# Patient Record
Sex: MALE | Race: OTHER | HISPANIC OR LATINO | Employment: OTHER | URBAN - METROPOLITAN AREA
[De-identification: names, ages, dates, MRNs, and addresses within clinical notes are randomized per-mention and may not be internally consistent; named-entity substitution may affect disease eponyms.]

---

## 2018-06-04 LAB
ABSOL LYMPHOCYTES (HISTORICAL): 1.9 K/UL (ref 0.5–4)
ALBUMIN SERPL BCP-MCNC: 4 G/DL (ref 3–5.2)
ALP SERPL-CCNC: 87 U/L (ref 43–122)
ALT SERPL W P-5'-P-CCNC: 34 U/L (ref 9–52)
ANION GAP SERPL CALCULATED.3IONS-SCNC: 7 MMOL/L (ref 5–14)
AST SERPL W P-5'-P-CCNC: 26 U/L (ref 17–59)
BASOPHILS # BLD AUTO: 0 % (ref 0–1)
BASOPHILS # BLD AUTO: 0 K/UL (ref 0–0.1)
BILIRUB SERPL-MCNC: 0.6 MG/DL
BUN SERPL-MCNC: 17 MG/DL (ref 5–25)
CALCIUM SERPL-MCNC: 9.2 MG/DL (ref 8.4–10.2)
CHLORIDE SERPL-SCNC: 104 MEQ/L (ref 97–108)
CK SERPL-CCNC: 107 U/L (ref 55–170)
CO2 SERPL-SCNC: 29 MMOL/L (ref 22–30)
COMMENT (HISTORICAL): ABNORMAL
CREATINE, SERUM (HISTORICAL): 0.98 MG/DL (ref 0.7–1.5)
DEPRECATED RDW RBC AUTO: 14.6 %
EGFR (HISTORICAL): >60 ML/MIN/1.73 M2
EOSINOPHIL # BLD AUTO: 0.4 K/UL (ref 0–0.4)
EOSINOPHIL NFR BLD AUTO: 5 % (ref 0–6)
GLUCOSE SERPL-MCNC: 72 MG/DL (ref 70–99)
HCT VFR BLD AUTO: 44.5 % (ref 41–53)
HGB BLD-MCNC: 14.7 G/DL (ref 13.5–17.5)
LIPASE SERPL-CCNC: 106 U/L (ref 23–300)
LYMPHOCYTES NFR BLD AUTO: 23 % (ref 25–45)
MCH RBC QN AUTO: 29 PG (ref 26–34)
MCHC RBC AUTO-ENTMCNC: 33 % (ref 31–36)
MCV RBC AUTO: 88 FL (ref 80–100)
METHYL ALCOHOL (HISTORICAL): NEGATIVE MG/DL
MONOCYTES # BLD AUTO: 1.3 K/UL (ref 0.2–0.9)
MONOCYTES NFR BLD AUTO: 16 % (ref 1–10)
NEUTROPHILS ABS COUNT (HISTORICAL): 4.7 K/UL (ref 1.8–7.8)
NEUTS SEG NFR BLD AUTO: 56 % (ref 45–65)
PLATELET # BLD AUTO: 369 K/MCL (ref 150–450)
POTASSIUM SERPL-SCNC: 4.3 MEQ/L (ref 3.6–5)
RBC # BLD AUTO: 5.06 M/MCL (ref 4.5–5.9)
RBC MORPHOLOGY (HISTORICAL): ABNORMAL
SODIUM SERPL-SCNC: 140 MEQ/L (ref 137–147)
TOTAL PROTEIN (HISTORICAL): 7.4 G/DL (ref 5.9–8.4)
TROPONIN I SERPL-MCNC: <0.01 NG/ML (ref 0–0.03)
WBC # BLD AUTO: 8.3 K/MCL (ref 4.5–11)

## 2018-10-28 ENCOUNTER — HOSPITAL ENCOUNTER (EMERGENCY)
Facility: HOSPITAL | Age: 43
Discharge: HOME | End: 2018-10-28
Attending: EMERGENCY MEDICINE
Payer: COMMERCIAL

## 2018-10-28 VITALS
TEMPERATURE: 98.1 F | WEIGHT: 117.28 LBS | RESPIRATION RATE: 16 BRPM | SYSTOLIC BLOOD PRESSURE: 122 MMHG | HEART RATE: 93 BPM | DIASTOLIC BLOOD PRESSURE: 67 MMHG | OXYGEN SATURATION: 100 %

## 2018-10-28 DIAGNOSIS — Z76.0 MEDICATION REFILL: Primary | ICD-10-CM

## 2018-10-28 PROCEDURE — 99281 EMR DPT VST MAYX REQ PHY/QHP: CPT

## 2018-10-28 RX ORDER — MIRTAZAPINE 15 MG/1
15 TABLET, FILM COATED ORAL
Qty: 5 TABLET | Refills: 0 | Status: SHIPPED | OUTPATIENT
Start: 2018-10-28

## 2018-10-28 RX ORDER — ARIPIPRAZOLE 15 MG/1
15 TABLET ORAL DAILY
Qty: 5 TABLET | Refills: 0 | Status: SHIPPED | OUTPATIENT
Start: 2018-10-28

## 2018-10-28 RX ORDER — ALPRAZOLAM 0.25 MG/1
0.25 TABLET ORAL 2 TIMES DAILY
Qty: 10 TABLET | Refills: 0 | Status: SHIPPED | OUTPATIENT
Start: 2018-10-28 | End: 2018-11-02

## 2018-10-28 NOTE — DISCHARGE INSTRUCTIONS
Medicine Refill   WHAT YOU NEED TO KNOW:   You may have been given a prescription in the emergency department for a few days of your medicine  It is important to refill your medicine before you completely run out  You need to follow up with your healthcare provider for a full prescription within the next few days  You will not be given additional refills in the emergency department  DISCHARGE INSTRUCTIONS:   Follow up with your healthcare provider:  Contact your healthcare provider before  you are completely out of medicine  Write down your questions so you remember to ask them during your visits  Refill tips:  Your medicine will treat your condition if you take the medicine regularly  Prevent missed doses by doing the following:  · Keep a chart of your medicine  Include all of your current medicines  Write down the name and strength of each medicine, the prescription number, and the number of refills  Also write down the dates of your refills  Ask your pharmacy or insurance provider for other ways to help you keep track of your medicines  · Refill medicines a few days before you run out  This will decrease any problems that will prevent you from getting your medicines on time  Problems include a closed pharmacy, or the pharmacy may have to contact your healthcare provider  · If you know you are going to be traveling, refill your medicines before you leave  You may not be able to get refills if you do not use your local pharmacy  You may need to call your insurance provider to make them aware of your travels  © 2017 [employer] Information is for End User's use only and may not be sold, redistributed or otherwise used for commercial purposes  All illustrations and images included in CareNotes® are the copyrighted property of [employer]  or Tomasz Rojas  The above information is an  only   It is not intended as medical advice for individual conditions or treatments  Talk to your doctor, nurse or pharmacist before following any medical regimen to see if it is safe and effective for you
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
## 2020-01-30 ENCOUNTER — HOSPITAL ENCOUNTER (EMERGENCY)
Facility: HOSPITAL | Age: 45
Discharge: LEFT BEFORE DISCHARGE | End: 2020-01-30
Attending: EMERGENCY MEDICINE | Admitting: EMERGENCY MEDICINE
Payer: COMMERCIAL

## 2020-01-30 ENCOUNTER — APPOINTMENT (EMERGENCY)
Dept: CT IMAGING | Facility: HOSPITAL | Age: 45
End: 2020-01-30
Payer: COMMERCIAL

## 2020-01-30 VITALS
RESPIRATION RATE: 16 BRPM | OXYGEN SATURATION: 100 % | HEART RATE: 118 BPM | WEIGHT: 119 LBS | TEMPERATURE: 98.5 F | DIASTOLIC BLOOD PRESSURE: 98 MMHG | SYSTOLIC BLOOD PRESSURE: 169 MMHG

## 2020-01-30 DIAGNOSIS — S09.90XA INJURY OF HEAD, INITIAL ENCOUNTER: ICD-10-CM
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
## 2020-01-30 PROCEDURE — 70450 CT HEAD/BRAIN W/O DYE: CPT

## 2020-01-30 PROCEDURE — 99284 EMERGENCY DEPT VISIT MOD MDM: CPT

## 2020-01-30 PROCEDURE — 99282 EMERGENCY DEPT VISIT SF MDM: CPT | Performed by: EMERGENCY MEDICINE

## 2020-01-30 PROCEDURE — 72125 CT NECK SPINE W/O DYE: CPT
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
## 2020-01-31 NOTE — ED NOTES
[employer] desk contacted per pt request - dispatcher states an [employer] officer will report to ER to speak with pt     Chrys Fleischer, RN  01/30/20 9658

## 2020-03-03 ENCOUNTER — HOSPITAL ENCOUNTER (INPATIENT)
Facility: HOSPITAL | Age: 45
LOS: 1 days | Discharge: LEFT BEFORE DISCHARGE | DRG: 812 | End: 2020-03-03
Attending: EMERGENCY MEDICINE | Admitting: FAMILY MEDICINE
Payer: COMMERCIAL

## 2020-03-03 ENCOUNTER — APPOINTMENT (EMERGENCY)
Dept: RADIOLOGY | Facility: HOSPITAL | Age: 45
DRG: 812 | End: 2020-03-03
Payer: COMMERCIAL

## 2020-03-03 VITALS
RESPIRATION RATE: 9 BRPM | TEMPERATURE: 98.4 F | DIASTOLIC BLOOD PRESSURE: 72 MMHG | SYSTOLIC BLOOD PRESSURE: 122 MMHG | OXYGEN SATURATION: 99 % | HEART RATE: 74 BPM | WEIGHT: 118.61 LBS

## 2020-03-03 DIAGNOSIS — T50.901A OVERDOSE: Primary | ICD-10-CM

## 2020-03-03 PROBLEM — I46.9 CARDIAC ARREST (HCC): Status: ACTIVE | Noted: 2020-03-03

## 2020-03-03 PROBLEM — F32.A DEPRESSION: Status: ACTIVE | Noted: 2020-03-03

## 2020-03-03 PROBLEM — Z72.89 ALCOHOL USE: Status: ACTIVE | Noted: 2020-03-03

## 2020-03-03 PROBLEM — R77.8 ELEVATED TROPONIN: Status: ACTIVE | Noted: 2020-03-03

## 2020-03-03 PROBLEM — F41.9 ANXIETY: Status: ACTIVE | Noted: 2020-03-03

## 2020-03-03 PROBLEM — G93.40 ACUTE ENCEPHALOPATHY: Status: ACTIVE | Noted: 2020-03-03

## 2020-03-03 PROBLEM — E87.29 HIGH ANION GAP METABOLIC ACIDOSIS: Status: ACTIVE | Noted: 2020-03-03

## 2020-03-03 PROBLEM — Z78.9 ALCOHOL USE: Status: ACTIVE | Noted: 2020-03-03

## 2020-03-03 PROBLEM — E87.2 HIGH ANION GAP METABOLIC ACIDOSIS: Status: ACTIVE | Noted: 2020-03-03

## 2020-03-03 PROBLEM — R79.89 ELEVATED TROPONIN: Status: ACTIVE | Noted: 2020-03-03

## 2020-03-03 LAB
ALBUMIN SERPL BCP-MCNC: 4.4 G/DL (ref 3–5.2)
ALP SERPL-CCNC: 83 U/L (ref 43–122)
ALT SERPL W P-5'-P-CCNC: 22 U/L (ref 9–52)
ANION GAP SERPL CALCULATED.3IONS-SCNC: 19 MMOL/L (ref 5–14)
AST SERPL W P-5'-P-CCNC: 30 U/L (ref 17–59)
ATRIAL RATE: 95 BPM
BILIRUB SERPL-MCNC: 0.4 MG/DL
BUN SERPL-MCNC: 11 MG/DL (ref 5–25)
CALCIUM SERPL-MCNC: 8.9 MG/DL (ref 8.4–10.2)
CHLORIDE SERPL-SCNC: 102 MMOL/L (ref 97–108)
CO2 SERPL-SCNC: 15 MMOL/L (ref 22–30)
CREAT SERPL-MCNC: 1.13 MG/DL (ref 0.7–1.5)
EOSINOPHIL # BLD AUTO: 0.26 THOUSAND/UL (ref 0–0.4)
EOSINOPHIL NFR BLD MANUAL: 3 % (ref 0–6)
ERYTHROCYTE [DISTWIDTH] IN BLOOD BY AUTOMATED COUNT: 15.6 %
ETHANOL SERPL-MCNC: 36 MG/DL (ref 0–10)
GFR SERPL CREATININE-BSD FRML MDRD: 78 ML/MIN/1.73SQ M
GLUCOSE SERPL-MCNC: 185 MG/DL (ref 70–99)
HCT VFR BLD AUTO: 46.4 % (ref 41–53)
HGB BLD-MCNC: 15.1 G/DL (ref 13.5–17.5)
LYMPHOCYTES # BLD AUTO: 2.55 THOUSAND/UL (ref 0.5–4)
LYMPHOCYTES # BLD AUTO: 30 % (ref 25–45)
MAGNESIUM SERPL-MCNC: 2.3 MG/DL (ref 1.6–2.3)
MCH RBC QN AUTO: 28.5 PG (ref 26–34)
MCHC RBC AUTO-ENTMCNC: 32.5 G/DL (ref 31–36)
MCV RBC AUTO: 88 FL (ref 80–100)
MONOCYTES # BLD AUTO: 1.62 THOUSAND/UL (ref 0.2–0.9)
MONOCYTES NFR BLD AUTO: 19 % (ref 1–10)
NEUTS BAND NFR BLD MANUAL: 1 % (ref 0–8)
NEUTS SEG # BLD: 3.83 THOUSAND/UL (ref 1.8–7.8)
NEUTS SEG NFR BLD AUTO: 44 %
P AXIS: 75 DEGREES
PHOSPHATE SERPL-MCNC: 5.4 MG/DL (ref 2.5–4.8)
PLATELET # BLD AUTO: 412 THOUSANDS/UL (ref 150–450)
PLATELET BLD QL SMEAR: ABNORMAL
PMV BLD AUTO: 8.8 FL (ref 8.9–12.7)
POTASSIUM SERPL-SCNC: 3.7 MMOL/L (ref 3.6–5)
PR INTERVAL: 120 MS
PROT SERPL-MCNC: 8.3 G/DL (ref 5.9–8.4)
QRS AXIS: 66 DEGREES
QRSD INTERVAL: 102 MS
QT INTERVAL: 358 MS
QTC INTERVAL: 449 MS
RBC # BLD AUTO: 5.29 MILLION/UL (ref 4.5–5.9)
RBC MORPH BLD: NORMAL
SODIUM SERPL-SCNC: 136 MMOL/L (ref 137–147)
T WAVE AXIS: 66 DEGREES
TOTAL CELLS COUNTED SPEC: 100
TROPONIN I SERPL-MCNC: 0.11 NG/ML (ref 0–0.03)
TROPONIN I SERPL-MCNC: <0.01 NG/ML (ref 0–0.03)
VARIANT LYMPHS # BLD AUTO: 3 % (ref 0–0)
VENTRICULAR RATE: 95 BPM
WBC # BLD AUTO: 8.5 THOUSAND/UL (ref 4.5–11)

## 2020-03-03 PROCEDURE — 36415 COLL VENOUS BLD VENIPUNCTURE: CPT | Performed by: EMERGENCY MEDICINE

## 2020-03-03 PROCEDURE — 99285 EMERGENCY DEPT VISIT HI MDM: CPT

## 2020-03-03 PROCEDURE — 99285 EMERGENCY DEPT VISIT HI MDM: CPT | Performed by: EMERGENCY MEDICINE

## 2020-03-03 PROCEDURE — 85027 COMPLETE CBC AUTOMATED: CPT | Performed by: EMERGENCY MEDICINE

## 2020-03-03 PROCEDURE — 83735 ASSAY OF MAGNESIUM: CPT | Performed by: NURSE PRACTITIONER

## 2020-03-03 PROCEDURE — 96365 THER/PROPH/DIAG IV INF INIT: CPT

## 2020-03-03 PROCEDURE — 93010 ELECTROCARDIOGRAM REPORT: CPT | Performed by: INTERNAL MEDICINE

## 2020-03-03 PROCEDURE — 93005 ELECTROCARDIOGRAM TRACING: CPT

## 2020-03-03 PROCEDURE — 85007 BL SMEAR W/DIFF WBC COUNT: CPT | Performed by: EMERGENCY MEDICINE

## 2020-03-03 PROCEDURE — 80053 COMPREHEN METABOLIC PANEL: CPT | Performed by: EMERGENCY MEDICINE

## 2020-03-03 PROCEDURE — 71046 X-RAY EXAM CHEST 2 VIEWS: CPT

## 2020-03-03 PROCEDURE — 84100 ASSAY OF PHOSPHORUS: CPT | Performed by: NURSE PRACTITIONER

## 2020-03-03 PROCEDURE — 96361 HYDRATE IV INFUSION ADD-ON: CPT

## 2020-03-03 PROCEDURE — 96375 TX/PRO/DX INJ NEW DRUG ADDON: CPT

## 2020-03-03 PROCEDURE — 84484 ASSAY OF TROPONIN QUANT: CPT | Performed by: EMERGENCY MEDICINE

## 2020-03-03 PROCEDURE — 80320 DRUG SCREEN QUANTALCOHOLS: CPT | Performed by: EMERGENCY MEDICINE

## 2020-03-03 RX ORDER — NALOXONE HYDROCHLORIDE 1 MG/ML
2 INJECTION PARENTERAL ONCE
Status: COMPLETED | OUTPATIENT
Start: 2020-03-03 | End: 2020-03-03

## 2020-03-03 RX ORDER — HEPARIN SODIUM 5000 [USP'U]/ML
5000 INJECTION, SOLUTION INTRAVENOUS; SUBCUTANEOUS EVERY 8 HOURS SCHEDULED
Status: CANCELLED | OUTPATIENT
Start: 2020-03-03

## 2020-03-03 RX ORDER — NALOXONE HYDROCHLORIDE 0.4 MG/ML
INJECTION, SOLUTION INTRAMUSCULAR; INTRAVENOUS; SUBCUTANEOUS
Status: COMPLETED
Start: 2020-03-03 | End: 2020-03-03

## 2020-03-03 RX ORDER — ONDANSETRON 2 MG/ML
4 INJECTION INTRAMUSCULAR; INTRAVENOUS ONCE
Status: COMPLETED | OUTPATIENT
Start: 2020-03-03 | End: 2020-03-03

## 2020-03-03 RX ORDER — ASPIRIN 81 MG/1
324 TABLET, CHEWABLE ORAL ONCE
Status: COMPLETED | OUTPATIENT
Start: 2020-03-03 | End: 2020-03-03

## 2020-03-03 RX ADMIN — Medication 0.04 MG: at 18:04

## 2020-03-03 RX ADMIN — ASPIRIN 81 MG 324 MG: 81 TABLET ORAL at 19:39

## 2020-03-03 RX ADMIN — SODIUM CHLORIDE 1000 ML: 0.9 INJECTION, SOLUTION INTRAVENOUS at 14:58

## 2020-03-03 RX ADMIN — NALOXONE HYDROCHLORIDE 0.4 MG/HR: 1 INJECTION PARENTERAL at 18:47

## 2020-03-03 RX ADMIN — ONDANSETRON 4 MG: 2 INJECTION INTRAMUSCULAR; INTRAVENOUS at 15:21

## 2020-03-03 NOTE — ED NOTES
Security released envelope #[id number]  With belongings to family   Family signed for belongings       Jolanta Ibarra  03/03/20 6750
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
## 2020-03-03 NOTE — ED NOTES
Noted patient with snoring and sleeping, awaken easily but returning to sleep, noted some apnea; Dr Gloria Vargas aware   Oxygen applied at this time     Kia Webb, RN  03/03/20 9329
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
## 2020-03-03 NOTE — ED NOTES
EKG completed and handed to physician  PT placed on cardiac monitor       Mona Guevara  03/03/20 4200

## 2020-03-03 NOTE — ED PROVIDER NOTES
History  Chief Complaint   Patient presents with    Overdose - Accidental     Per EMS pt found unresponsive, CPR was initiated and narcan was administered  Pt alert and oriented at this time     39 yo M w/ PMH of depression, anxiety, schizophrenia, and a "heart disorder" presents to ED via EMS after an accidental OD from unknown substance  Pt admits to ETOH use since last night, and doesn't know what he snorted this afternoon, but denies regular drug use  Denies HI/SI  Denies pain, sob, just "doesn't feel well " had unknown white powder on self  Was unresponsive at home, had bystander cpr  Was agonal breathing when EMS arrived, got 2mg IN narcan with improvement, however then lost pulses for about a minute, got CPR by EMS for about a minute, improved with an additional 2 mg IV narcan  History provided by:  Patient and medical records   used: No    Overdose - Accidental   Ingested substance:  Illicit drugs and alcohol  Illicit drug type:  Unable to specify  Witnesses present: yes    Called poison control: no    Incident location:  Home  Context: intentional ingestion and substance found on patient's clothing    Associated symptoms: altered mental status, nausea, unresponsiveness and vomiting    Associated symptoms: no abdominal pain, no chest pain, no cough, no diaphoresis, no diarrhea, no headaches and no shortness of breath    Altered mental status:     Severity:  Severe    Onset quality:  Sudden      Prior to Admission Medications   Prescriptions Last Dose Informant Patient Reported? Taking?    ALPRAZolam (XANAX) 0 25 mg tablet   No No   Sig: Take 1 tablet (0 25 mg total) by mouth 2 (two) times a day for 10 doses   ARIPiprazole (ABILIFY) 15 mg tablet   No No   Sig: Take 1 tablet (15 mg total) by mouth daily   mirtazapine (REMERON) 15 mg tablet   No No   Sig: Take 1 tablet (15 mg total) by mouth daily at bedtime      Facility-Administered Medications: None       Past Medical History: Diagnosis Date    Anxiety     Depression     Schizophrenia [facility])        History reviewed  No pertinent surgical history  History reviewed  No pertinent family history  I have reviewed and agree with the history as documented  E-Cigarette/Vaping     E-Cigarette/Vaping Substances     Social History     Tobacco Use    Smoking status: Current Every Day Smoker     Types: Cigarettes    Smokeless tobacco: Never Used   Substance Use Topics    Alcohol use: Yes     Comment: socially    Drug use: Yes     Types: Cocaine       Review of Systems   Constitutional: Negative for chills, diaphoresis, fatigue, fever and unexpected weight change  HENT: Negative for congestion, ear pain, rhinorrhea, sore throat, trouble swallowing and voice change  Eyes: Negative for pain and visual disturbance  Respiratory: Negative for cough, chest tightness and shortness of breath  Cardiovascular: Negative for chest pain, palpitations and leg swelling  Gastrointestinal: Positive for nausea and vomiting  Negative for abdominal pain, blood in stool, constipation and diarrhea  Genitourinary: Negative for difficulty urinating and hematuria  Musculoskeletal: Negative for arthralgias, back pain and neck pain  Skin: Negative for rash  Neurological: Negative for dizziness, syncope, light-headedness and headaches  Psychiatric/Behavioral: Negative for confusion and suicidal ideas  The patient is not nervous/anxious  Physical Exam  Physical Exam   Constitutional: He is oriented to person, place, and time  He appears well-developed and well-nourished  No distress  HENT:   Head: Normocephalic and atraumatic  Right Ear: External ear normal    Left Ear: External ear normal    Nose: Nose normal    Mouth/Throat: Oropharynx is clear and moist    Pupils 2mm b/l, briskly responsive   Eyes: Pupils are equal, round, and reactive to light  Conjunctivae and EOM are normal  Right eye exhibits no discharge   Left eye exhibits no discharge  No scleral icterus  Neck: Normal range of motion  Neck supple  No JVD present  No tracheal deviation present  Cardiovascular: Regular rhythm, normal heart sounds and intact distal pulses  Tachycardia present  Exam reveals no gallop and no friction rub  No murmur heard  Pulmonary/Chest: Effort normal and breath sounds normal  No stridor  No respiratory distress  He has no wheezes  He has no rales  He exhibits no tenderness  Abdominal: Soft  Bowel sounds are normal  He exhibits no distension  There is no tenderness  There is no rebound and no guarding  Musculoskeletal: Normal range of motion  He exhibits no edema, tenderness or deformity  Lymphadenopathy:     He has no cervical adenopathy  Neurological: He is alert and oriented to person, place, and time  No cranial nerve deficit or sensory deficit  Coordination normal  GCS eye subscore is 4  GCS verbal subscore is 5  GCS motor subscore is 6  Skin: Skin is warm and dry  No rash noted  He is not diaphoretic  No track marks noted  Psychiatric: He has a normal mood and affect  His behavior is normal    Nursing note and vitals reviewed        Vital Signs  ED Triage Vitals   Temperature Pulse Respirations Blood Pressure SpO2   03/03/20 1440 03/03/20 1440 03/03/20 1440 03/03/20 1440 03/03/20 1440   98 4 °F (36 9 °C) (!) 109 16 116/79 100 %      Temp Source Heart Rate Source Patient Position - Orthostatic VS BP Location FiO2 (%)   03/03/20 1440 03/03/20 1440 03/03/20 1440 03/03/20 1440 --   Tympanic Monitor Lying Left arm       Pain Score       03/03/20 1532       No Pain           Vitals:    03/03/20 1815 03/03/20 1830 03/03/20 1845 03/03/20 1906   BP:    121/79   Pulse: 72 84 89 68   Patient Position - Orthostatic VS:    Lying         Visual Acuity      ED Medications  Medications   naloxone (NARCAN) 2 mg in sodium chloride 0 9 % 500 mL infusion (0 4 mg/hr Intravenous New Bag 3/3/20 3956)   sodium chloride 0 9 % bolus 1,000 mL (0 mL Intravenous Stopped 3/3/20 1643)   ondansetron (ZOFRAN) injection 4 mg (4 mg Intravenous Given 3/3/20 1521)   naloxone (FOR EMS ONLY) [facility]) 2 MG/2ML injection 4 mg (0 mg Does not apply Given to EMS 3/3/20 1450)   naloxone (NARCAN) 0 04 mg/mL syringe 0 04 mg (0 04 mg Intravenous Given 3/3/20 1804)   naloxone (NARCAN) 0 4 mg/mL injection **ADS Override Pull** (  Override Pull 3/3/20 9587)       Diagnostic Studies  Results Reviewed     Procedure Component Value Units Date/Time    Troponin I [[id number]]  (Abnormal) Collected:  03/03/20 1840    Lab Status:  Final result Specimen:  Blood from Arm, Right Updated:  03/03/20 1914     Troponin I 0 11 ng/mL     Troponin I [[id number]]  (Normal) Collected:  03/03/20 1458    Lab Status:  Final result Specimen:  Blood from Arm, Right Updated:  03/03/20 1535     Troponin I <0 01 ng/mL     CBC and differential [[id number]]  (Abnormal) Collected:  03/03/20 1458    Lab Status:  Final result Specimen:  Blood from Arm, Right Updated:  03/03/20 1528     WBC 8 50 Thousand/uL      RBC 5 29 Million/uL      Hemoglobin 15 1 g/dL      Hematocrit 46 4 %      MCV 88 fL      MCH 28 5 pg      MCHC 32 5 g/dL      RDW 15 6 %      MPV 8 8 fL      Platelets 430 Thousands/uL     Ethanol [[id number]]  (Abnormal) Collected:  03/03/20 1458    Lab Status:  Final result Specimen:  Blood from Arm, Right Updated:  03/03/20 1528     Ethanol Lvl 36 mg/dL     Comprehensive metabolic panel [[id number]]  (Abnormal) Collected:  03/03/20 1458    Lab Status:  Final result Specimen:  Blood from Arm, Right Updated:  03/03/20 1528     Sodium 136 mmol/L      Potassium 3 7 mmol/L      Chloride 102 mmol/L      CO2 15 mmol/L      ANION GAP 19 mmol/L      BUN 11 mg/dL      Creatinine 1 13 mg/dL      Glucose 185 mg/dL      Calcium 8 9 mg/dL      AST 30 U/L      ALT 22 U/L      Alkaline Phosphatase 83 U/L      Total Protein 8 3 g/dL      Albumin 4 4 g/dL      Total Bilirubin 0 40 mg/dL      eGFR 78 ml/min/1 73sq m     Narrative: [facility] guidelines for Chronic Kidney Disease (CKD):     Stage 1 with normal or high GFR (GFR > 90 mL/min/1 73 square meters)    Stage 2 Mild CKD (GFR = 60-89 mL/min/1 73 square meters)    Stage 3A Moderate CKD (GFR = 45-59 mL/min/1 73 square meters)    Stage 3B Moderate CKD (GFR = 30-44 mL/min/1 73 square meters)    Stage 4 Severe CKD (GFR = 15-29 mL/min/1 73 square meters)    Stage 5 End Stage CKD (GFR <15 mL/min/1 73 square meters)  Note: GFR calculation is accurate only with a steady state creatinine    Rapid drug screen, urine [[id number]]     Lab Status:  No result Specimen:  Urine                  XR chest 2 views   ED Interpretation by Sohail Ramirez MD (03/03 6590)   No acute findings, specifically no rib fx or ptx or consoliation      Final Result by Keren Mcnally MD (03/03 8377)      No acute cardiopulmonary disease              Workstation performed: [id number]                    Procedures  ECG 12 Lead Documentation Only  Date/Time: 3/3/2020 2:59 PM  Performed by: Sohail Ramirez MD  Authorized by: Sohail Ramirez MD     Indications / Diagnosis:  Overdose  ECG reviewed by me, the ED Provider: yes    Patient location:  ED  Previous ECG:     Previous ECG:  Unavailable    Comparison to cardiac monitor: Yes    Interpretation:     Interpretation: abnormal    Rate:     ECG rate:  95    ECG rate assessment: normal    Rhythm:     Rhythm: sinus rhythm    Ectopy:     Ectopy: none    QRS:     QRS axis:  Normal  Conduction:     Conduction: normal    ST segments:     ST segments:  Non-specific  T waves:     T waves: non-specific    ECG 12 Lead Documentation Only  Date/Time: 3/3/2020 7:01 PM  Performed by: Sohail Ramirez MD  Authorized by: Sohail Ramirez MD     Indications / Diagnosis:  Od  ECG reviewed by me, the ED Provider: yes    Patient location:  ED  Previous ECG:     Previous ECG:  Compared to current    Similarity:  No change  Rate:     ECG rate: 63    ECG rate assessment: normal               ED Course  ED Course as of Mar 03 1915   Tue Mar 03, 2020   1537 Labs benign so far  Will check delta trop, continue to monitor  1740 Pt desats while sleeping to high 80s, but easily aroused by voice, with improvement to 99%  Will give NC O2 and monitor  1753 Will give another dose narcan, becoming more bradypneic and end tidal 50s  Sats fine  1810 Narcan 0 04 mg only helped minimally  That would be his 3rd dose  Will start narcan drip  MDM  Number of Diagnoses or Management Options  Overdose: new and requires workup     Amount and/or Complexity of Data Reviewed  Clinical lab tests: ordered and reviewed  Tests in the radiology section of CPT®: ordered and reviewed  Tests in the medicine section of CPT®: ordered and reviewed  Discuss the patient with other providers: yes  Independent visualization of images, tracings, or specimens: yes    Risk of Complications, Morbidity, and/or Mortality  Presenting problems: moderate  Diagnostic procedures: low  Management options: moderate    Patient Progress  Patient progress: stable        Disposition  Final diagnoses:   Overdose     Time reflects when diagnosis was documented in both MDM as applicable and the Disposition within this note     Time User Action Codes Description Comment    3/3/2020  7:11 PM [place], [address]  Overdose       ED Disposition     ED Disposition Condition Date/Time Comment    Admit Stable Tue Mar 3, 2020  7:11 PM Case was discussed with Maite Stern and the patient's admission status was agreed to be Admission Status: inpatient status to the service of Dr Tiffanie Morrissey   Follow-up Information    None         Patient's Medications   Discharge Prescriptions    No medications on file     No discharge procedures on file      PDMP Review     None          ED Provider  Electronically Signed by           Orin Arredondo MD  03/03/20 7462

## 2020-03-04 LAB
ATRIAL RATE: 63 BPM
P AXIS: 70 DEGREES
PR INTERVAL: 112 MS
QRS AXIS: 70 DEGREES
QRSD INTERVAL: 100 MS
QT INTERVAL: 398 MS
QTC INTERVAL: 407 MS
T WAVE AXIS: 48 DEGREES
VENTRICULAR RATE: 63 BPM

## 2020-03-04 PROCEDURE — 93010 ELECTROCARDIOGRAM REPORT: CPT | Performed by: INTERNAL MEDICINE

## 2020-03-04 NOTE — ASSESSMENT & PLAN NOTE
· Brought in to ED via EMS for suspected drug overdose   · Was given Narcan 2mg IN in the field by EMS with minimal improvement, after which he was found to be pulseless, CPR was provided for approx 1 min with return of pulses per EMS, and he was given addition Narcan 2mg IN

## 2020-03-04 NOTE — ED NOTES
Tech entered room to draw PT's ammonia lab; PT asked staff if his family will be able to stay overnight with him once admitted  PT was advised that he can ask the staff at his new floor on arrival but that this will more than likely not be permitted since visiting hours are over  PT then stated, "I wanna sign and get out of here now and not stay  Can I do that?"  And PT stated to his family members, "Voy a firmar" (aka I am going to sign)  Staff clarified AMA refusal wishes with PT and advised ALESSANDRA Fair  03/03/20 2031

## 2020-03-04 NOTE — ED NOTES
Patient wants to go DIONICIO Montanez NP in to speak with patient and patient still wants to go home  Narcan gtt stopped per NP  Will evaluate patient in 10-15 min per NP       Gerrit Hodgkins, RN  03/03/20 6768

## 2020-03-04 NOTE — PROGRESS NOTES
Mr Varsha Allred was initially called for admission in the setting of accidental drug overdose of unknown substance requiring several doses of Narcan followed by Narcan gtt, ETOH use, cardiac arrest in the field requiring 1 minute of CPR by EMS and bystanders, and elevated troponin  Initial H&P interview was performed by myself, at which time the patient stated that he snorted an unknown white powder he was told was cocaine in the presence of several other people in his home sometime earlier today  He is unclear regarding who activated EMS, but per records, he was found to be unconscious and was given IN Narcan, after which pulses were lost and he received approx 1 minute of bystander and EMS CPR, after which pulses were regained  He was then given an additional dose of IN Narcan by EMS, with reported improvement in mental status  He was transported to the ER  In the ER, he required additional Narcan secondary to repeated respiratory suppression and AMS, after which he was placed on a Narcan gtt and referred for admission  During my H&P interview, Mr Varsha Allred denied regular drug or ETOH use, and reported that while he was spending time with some friends and family today, someone brought drugs over and Mr Varsha Allred took "one bump"  He denies SOB, CP, abd pain, n/v/d, palpitations, hallucinations, HA, dizziness  He reports a mild sore throat and intermittent, mild R shoulder tenderness x2 weeks  He denies SI/HI  He was found to initially have a negative troponin, however subsequent recheck was elevated at 0 11, without ST elevation on EKG x2  Orders are placed for admission at this time  ER RN [facility] pages me after H&P interview to inform me that the patient wishes to leave AMA  On my arrival, he cites inability of his friends to remain at his bedside and frequent blood work as his reasons   I reassure him that RN staff and myself are present to assist him throughout the night and that visiting hours will restart  In the morning  I also educate him on the need for serial blood work, but assure him that we will cluster care and work diligently to ensure the best rest possible overnight  However, he states he still wishes to leave AMA  I explain to Mr Dameon Griffiths, with [facility], RN at the beside as a witness, that I believe it is in his best interest to say, and educate him on the multiple above comorbidities and he verbalizes his understanding  I further inform him that if he leaves, he is at risk for further altered mental status, respiratory depression, respiratory failure, MI, cardiac arrest, aspiration and death  He states that while he understands these risks, he still wishes to sign out AMA  Vitals:    03/03/20 1930 03/03/20 1945 03/03/20 2000 03/03/20 2015   BP: 126/77  122/72    BP Location:       Pulse: 70 81 90 74   Resp: 12 (!) 10 20 (!) 9   Temp:       TempSrc:       SpO2: 100% 100% 98% 99%   Weight:           Physical Exam   Constitutional: He is oriented to person, place, and time  Vital signs are normal  He appears well-developed and well-nourished  He is cooperative  He is easily aroused  Non-toxic appearance  He does not have a sickly appearance  No distress  Nasal cannula in place  HENT:   Head: Normocephalic and atraumatic  Mouth/Throat: Oropharynx is clear and moist and mucous membranes are normal  No oropharyngeal exudate  Eyes: EOM and lids are normal  Right eye exhibits no discharge  Left eye exhibits no discharge  No scleral icterus  Neck: Normal range of motion  Neck supple  No tracheal deviation present  Cardiovascular: Normal rate, regular rhythm, normal heart sounds and intact distal pulses  Exam reveals no gallop and no friction rub  No murmur heard  Pulses:       Radial pulses are 2+ on the right side, and 2+ on the left side  Dorsalis pedis pulses are 2+ on the right side, and 2+ on the left side     Pulmonary/Chest: Effort normal and breath sounds normal  No accessory muscle usage  No respiratory distress  He has no decreased breath sounds  He has no wheezes  He exhibits no tenderness  Abdominal: Soft  Normal appearance and bowel sounds are normal  He exhibits no distension  There is no tenderness  Genitourinary:   Genitourinary Comments: Voiding independently    Musculoskeletal: Normal range of motion  He exhibits no edema, tenderness or deformity  Lymphadenopathy:     He has no cervical adenopathy  Neurological: He is alert, oriented to person, place, and time and easily aroused  No cranial nerve deficit  GCS eye subscore is 4  GCS verbal subscore is 5  GCS motor subscore is 6  Skin: Skin is warm and dry  Capillary refill takes less than 2 seconds  No rash noted  He is not diaphoretic  No erythema  No pallor  Psychiatric: He has a normal mood and affect  His speech is normal and behavior is normal  Judgment and thought content normal    Nursing note and vitals reviewed  Mr Suad Blackwood states that he has two friends who will be staying with him overnight  I make sure he is aware that he or his family/friends should call 911 and return to any emergency room for any reason, and that he is welcome back to this ER at any time  He is AAOx3, in NAD and verbalizes understanding  He is agreeable to remaining in the ER for 15 minutes after Narcan gtt is stopped to assess his respiratory and mental status, during which time his PIV will remain present  ALESSANDRA Field aware of this  Dr Sara Rasheed, referring ER physician  Is updated and aware

## 2021-09-19 ENCOUNTER — HOSPITAL ENCOUNTER (EMERGENCY)
Facility: HOSPITAL | Age: 46
Discharge: LEFT BEFORE DISCHARGE | End: 2021-09-19
Attending: EMERGENCY MEDICINE
Payer: COMMERCIAL

## 2021-09-19 VITALS
SYSTOLIC BLOOD PRESSURE: 134 MMHG | WEIGHT: 111.8 LBS | TEMPERATURE: 97.8 F | RESPIRATION RATE: 20 BRPM | HEART RATE: 85 BPM | OXYGEN SATURATION: 99 % | DIASTOLIC BLOOD PRESSURE: 87 MMHG

## 2021-09-19 DIAGNOSIS — S61.412A LACERATION OF LEFT HAND WITHOUT FOREIGN BODY, INITIAL ENCOUNTER: Primary | ICD-10-CM

## 2021-09-19 PROCEDURE — 99282 EMERGENCY DEPT VISIT SF MDM: CPT

## 2021-09-19 PROCEDURE — 99284 EMERGENCY DEPT VISIT MOD MDM: CPT | Performed by: PHYSICIAN ASSISTANT

## 2021-09-19 PROCEDURE — 12013 RPR F/E/E/N/L/M 2.6-5.0 CM: CPT | Performed by: PHYSICIAN ASSISTANT

## 2021-09-19 RX ORDER — LIDOCAINE HYDROCHLORIDE 20 MG/ML
10 INJECTION, SOLUTION EPIDURAL; INFILTRATION; INTRACAUDAL; PERINEURAL ONCE
Status: COMPLETED | OUTPATIENT
Start: 2021-09-19 | End: 2021-09-19

## 2021-09-19 RX ADMIN — LIDOCAINE HYDROCHLORIDE 10 ML: 20 INJECTION, SOLUTION EPIDURAL; INFILTRATION; INTRACAUDAL; PERINEURAL at 20:39

## 2021-09-20 NOTE — ED PROVIDER NOTES
History  Chief Complaint   Patient presents with    Hand Laceration     Pt cut hand on glass while cooking  Pt has laceration on left hand  Pt has shirt wrapped around hand and shadowing is present  25-year-old right-handed male without significant past medical history presents complaining of a left hand laceration  Patient does need accidentally cut his hand on glass while cooking  Denies any blood thinners  Last tetanus less than 5 years ago  Denies any other injuries  Denies any other complaints  History provided by:  Patient   used: No        Prior to Admission Medications   Prescriptions Last Dose Informant Patient Reported? Taking? ALPRAZolam (XANAX) 0 25 mg tablet   No No   Sig: Take 1 tablet (0 25 mg total) by mouth 2 (two) times a day for 10 doses   ARIPiprazole (ABILIFY) 15 mg tablet   No No   Sig: Take 1 tablet (15 mg total) by mouth daily   mirtazapine (REMERON) 15 mg tablet   No No   Sig: Take 1 tablet (15 mg total) by mouth daily at bedtime      Facility-Administered Medications: None       Past Medical History:   Diagnosis Date    Anxiety     Depression     [facility]        History reviewed  No pertinent surgical history  History reviewed  No pertinent family history  I have reviewed and agree with the history as documented  E-Cigarette/Vaping     E-Cigarette/Vaping Substances     Social History     Tobacco Use    Smoking status: Current Every Day Smoker     Types: Cigarettes    Smokeless tobacco: Never Used   Substance Use Topics    Alcohol use: Yes     Comment: socially    Drug use: Yes     Types: Cocaine       Review of Systems   Constitutional: Negative  Negative for chills and fatigue  HENT: Negative for ear pain and sore throat  Eyes: Negative for photophobia and redness  Respiratory: Negative for apnea, cough and shortness of breath  Cardiovascular: Negative for chest pain     Gastrointestinal: Negative for abdominal pain, nausea and vomiting  Genitourinary: Negative for dysuria  Musculoskeletal: Negative for arthralgias, neck pain and neck stiffness  Skin: Positive for wound  Negative for rash  Neurological: Negative for dizziness, tremors, syncope and weakness  Psychiatric/Behavioral: Negative for suicidal ideas  Physical Exam  Physical Exam  Constitutional:       General: He is not in acute distress  Appearance: He is well-developed  He is not diaphoretic  Eyes:      Pupils: Pupils are equal, round, and reactive to light  Cardiovascular:      Rate and Rhythm: Normal rate and regular rhythm  Pulmonary:      Effort: Pulmonary effort is normal  No respiratory distress  Breath sounds: Normal breath sounds  Abdominal:      General: Bowel sounds are normal  There is no distension  Palpations: Abdomen is soft  Musculoskeletal:         General: Normal range of motion  Cervical back: Normal range of motion and neck supple  Skin:     General: Skin is warm and dry  Comments: 5 cm linear laceration over the left dorsal aspect of the 2nd MCP with oozing bleeding  No obvious tendon or nerve involvement  Neurovascular intact distally  Neurological:      Mental Status: He is alert and oriented to person, place, and time           Vital Signs  ED Triage Vitals [09/19/21 2015]   Temperature Pulse Respirations Blood Pressure SpO2   97 8 °F (36 6 °C) 85 20 134/87 99 %      Temp Source Heart Rate Source Patient Position - Orthostatic VS BP Location FiO2 (%)   Tympanic Monitor -- -- --      Pain Score       --           Vitals:    09/19/21 2015   BP: 134/87   Pulse: 85         Visual Acuity      ED Medications  Medications   lidocaine (PF) (XYLOCAINE-MPF) 2 % injection 10 mL (10 mL Infiltration Given 9/19/21 2039)       Diagnostic Studies  Results Reviewed     None                 No orders to display              Procedures  Laceration repair    Date/Time: 9/19/2021 9:28 PM  Performed by: Shayan Omer Cassandra Isabel PA-C  Authorized by: Marshall Alcaraz PA-C   Consent: Verbal consent obtained  Risks and benefits: risks, benefits and alternatives were discussed  Consent given by: patient  Patient understanding: patient states understanding of the procedure being performed  Patient consent: the patient's understanding of the procedure matches consent given  Procedure consent: procedure consent matches procedure scheduled  Relevant documents: relevant documents present and verified  Test results: test results available and properly labeled  Site marked: the operative site was marked  Radiology Images displayed and confirmed  If images not available, report reviewed: imaging studies available  Required items: required blood products, implants, devices, and special equipment available  Patient identity confirmed: verbally with patient  Time out: Immediately prior to procedure a "time out" was called to verify the correct patient, procedure, equipment, support staff and site/side marked as required  Body area: upper extremity  Location details: left index finger  Laceration length: 5 cm  Foreign bodies: no foreign bodies  Tendon involvement: none  Nerve involvement: none  Vascular damage: no    Anesthesia:  Local Anesthetic: lidocaine 2% without epinephrine  Anesthetic total: 7 mL    Sedation:  Patient sedated: no      Wound Dehiscence:  Superficial Wound Dehiscence: simple closure      Procedure Details:  Preparation: Patient was prepped and draped in the usual sterile fashion  Irrigation solution: saline  Amount of cleaning: standard  Debridement: none  Degree of undermining: none  Skin closure: 5-0 nylon  Number of sutures: 4  Technique: simple  Approximation: close  Approximation difficulty: simple  Comments: Patient was prepped and draped in a sterile manner and anesthetic applied to achieve adequate anesthesia  [place] through the procedure patient began to feel faint and stated that he was going to pass out  Nurse was called in the room to bring water to the patient and patient was lied down on the bed  Following this episode patient no longer wanted stitches  Patient refused to continue procedure  Patient was strongly encouraged to complete laceration repair however continued to refuse  Patient advised of all risks including infection, loss of limb, sepsis and improper healing, demonstrates understanding  Procedure terminated at patient's request   Steri-Strips placed over remaining portion of the wound  ED Course                                           MDM  Number of Diagnoses or Management Options  Laceration of left hand without foreign body, initial encounter: new and does not require workup  Diagnosis management comments: Laceration repair attempted by me but unable to be completed at patient's request   Patient began to feel faint and then requested the procedure be terminated  Patient was advised that this could lead to improper healing as well as wound infection loss of finger sepsis and death  Demonstrates understanding  Steri-Strips placed on remainder of wound in bleeding controlled  Neurovascularly intact following procedure  Patient demonstrates understanding of all return precautions and risks of leaving against medical advice  Signed out AMA      Risk of Complications, Morbidity, and/or Mortality  Presenting problems: moderate  Diagnostic procedures: moderate  Management options: moderate    Patient Progress  Patient progress: stable      Disposition  Final diagnoses:   Laceration of left hand without foreign body, initial encounter     Time reflects when diagnosis was documented in both MDM as applicable and the Disposition within this note     Time User Action Codes Description Comment    9/19/2021  9:00 PM Chester Quarles Add [[id number]] Laceration of left hand without foreign body, initial encounter       ED Disposition     ED Disposition Condition Date/Time Comment    AMA Cain Lamberto Sep 19, 2021  9:01 PM Date: 9/19/2021  Patient: Joyce Lazo  Admitted: 9/19/2021  8:09 PM  Attending Provider: [place] MD    Joyce Lazo or his authorized caregiver has made the decision for the patient to leave the emergency department against the advi ce of his attending physician  He or his authorized caregiver has been informed and understands the inherent risks, including death, infection, loss of limb   He or his authorized caregiver has decided to accept the responsibility for this decision  Joyce Lazo and all necessary parties have been advised that he may return for further evaluation or treatment  His condition at time of discharge was stable    Joyce Lazo had current vital signs as follows:  /87   Pulse 85   Temp  97 8 °F (36 6 °C) (Tympanic)   Resp 20   Wt 50 7 kg (111 lb 12 8 oz)         Follow-up Information     Follow up With Specialties Details Why Contact Info Additional Information    Jah Minor MD Family Medicine Call  As needed [address] Family Medicine Schedule an appointment as soon as possible for a visit  If symptoms worsen [address], [address], [address], [address] [place], [place], [address]          Discharge Medication List as of 9/19/2021  9:02 PM      CONTINUE these medications which have NOT CHANGED    Details   ALPRAZolam (XANAX) 0 25 mg tablet Take 1 tablet (0 25 mg total) by mouth 2 (two) times a day for 10 doses, Starting Sun 10/28/2018, Until Fri 11/2/2018, Print      ARIPiprazole (ABILIFY) 15 mg tablet Take 1 tablet (15 mg total) by mouth daily, Starting Sun 10/28/2018, Print      mirtazapine (REMERON) 15 mg tablet Take 1 tablet (15 mg total) by mouth daily at bedtime, Starting Sun 10/28/2018, Print No discharge procedures on file      PDMP Review     None          ED Provider  Electronically Signed by           Rock Dakotah PA-C  09/19/21 9991 DVT ppx: IMPROVE 2, AC contraindicated given GIB, SCDs  Diet: Regular  Dispo: Will speak to Dr. Ambrocio regarding H/H and readiness for dispo - c/w Valacyclovir 1000mg BID  - c/w prednisolone 1% drops BID L eye  - c/w bacitracin ointment QID L eye  - c/w Ofloxacin QID L eye  - Ophtho following, appreciate recs

## 2021-09-20 NOTE — DISCHARGE INSTRUCTIONS
Return for worsening complaints including signs of infection or fever  Return in 7-10 days for suture removal   Return sooner for any concerns  Follow up with primary care

## 2021-09-20 NOTE — ED NOTES
Pt refused remainder of stitches per provider, deciding to sign out DIONICIO Akers RN  09/19/21 2100

## 2023-03-28 ENCOUNTER — HOSPITAL ENCOUNTER (EMERGENCY)
Facility: HOSPITAL | Age: 48
Discharge: HOME | End: 2023-03-28
Attending: EMERGENCY MEDICINE

## 2023-03-28 VITALS
OXYGEN SATURATION: 99 % | TEMPERATURE: 98.5 F | HEART RATE: 86 BPM | DIASTOLIC BLOOD PRESSURE: 97 MMHG | RESPIRATION RATE: 18 BRPM | WEIGHT: 119.7 LBS | SYSTOLIC BLOOD PRESSURE: 161 MMHG

## 2023-03-28 DIAGNOSIS — F41.8 ANXIETY ABOUT HEALTH: Primary | ICD-10-CM
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
## 2024-05-01 ENCOUNTER — OFFICE VISIT (OUTPATIENT)
Dept: FAMILY MEDICINE CLINIC | Facility: CLINIC | Age: 49
End: 2024-05-01

## 2024-05-01 VITALS
HEART RATE: 68 BPM | HEIGHT: 67 IN | RESPIRATION RATE: 16 BRPM | OXYGEN SATURATION: 98 % | WEIGHT: 114 LBS | TEMPERATURE: 98 F | SYSTOLIC BLOOD PRESSURE: 108 MMHG | DIASTOLIC BLOOD PRESSURE: 70 MMHG | BODY MASS INDEX: 17.89 KG/M2

## 2024-05-01 DIAGNOSIS — Z76.0 MEDICATION REFILL: ICD-10-CM

## 2024-05-01 DIAGNOSIS — Z13.220 SCREENING CHOLESTEROL LEVEL: ICD-10-CM

## 2024-05-01 DIAGNOSIS — Z53.20 HIV SCREENING DECLINED: ICD-10-CM

## 2024-05-01 DIAGNOSIS — Z13.1 SCREENING FOR DIABETES MELLITUS: ICD-10-CM

## 2024-05-01 DIAGNOSIS — F41.9 ANXIETY: ICD-10-CM

## 2024-05-01 DIAGNOSIS — R22.1 NECK MASS: ICD-10-CM

## 2024-05-01 DIAGNOSIS — Z00.00 ANNUAL PHYSICAL EXAM: ICD-10-CM

## 2024-05-01 DIAGNOSIS — K59.00 CONSTIPATION, UNSPECIFIED CONSTIPATION TYPE: Primary | ICD-10-CM

## 2024-05-01 DIAGNOSIS — Z53.20 SCREENING FOR HEPATITIS C DECLINED: ICD-10-CM

## 2024-05-01 DIAGNOSIS — R22.2 CHEST WALL MASS: ICD-10-CM

## 2024-05-01 PROBLEM — F20.89 OTHER SCHIZOPHRENIA (HCC): Status: ACTIVE | Noted: 2024-05-01

## 2024-05-01 PROBLEM — F32.A DEPRESSION: Status: RESOLVED | Noted: 2020-03-03 | Resolved: 2024-05-01

## 2024-05-01 PROCEDURE — 99386 PREV VISIT NEW AGE 40-64: CPT | Performed by: NURSE PRACTITIONER

## 2024-05-01 PROCEDURE — 99203 OFFICE O/P NEW LOW 30 MIN: CPT | Performed by: NURSE PRACTITIONER

## 2024-05-01 RX ORDER — ARIPIPRAZOLE 5 MG/1
5 TABLET ORAL DAILY
Qty: 30 TABLET | Refills: 0 | Status: SHIPPED | OUTPATIENT
Start: 2024-05-01

## 2024-05-01 RX ORDER — POLYETHYLENE GLYCOL 3350 17 G/17G
17 POWDER, FOR SOLUTION ORAL DAILY
Qty: 510 G | Refills: 0 | Status: SHIPPED | OUTPATIENT
Start: 2024-05-01

## 2024-05-01 RX ORDER — MIRTAZAPINE 7.5 MG/1
7.5 TABLET, FILM COATED ORAL
Qty: 30 TABLET | Refills: 0 | Status: SHIPPED | OUTPATIENT
Start: 2024-05-01 | End: 2024-05-01

## 2024-05-01 NOTE — ASSESSMENT & PLAN NOTE
Patient currently not following with MH provider, he would like to re-establish and restart medication before he has exacerbation of symptoms   He requests to restart alprazolam only, states this was most effective for him at controlling anger and anxiety  Discussed how this is not preferred treatment and I would not recommend this   On record review patient was also taking Abilify, more appropriate to restart

## 2024-05-01 NOTE — ASSESSMENT & PLAN NOTE
Firm, tender mass posterior neck ~2 cm   Patient states has gotten much larger over past several months   U/S ordered

## 2024-05-01 NOTE — PATIENT INSTRUCTIONS
Outpatient Mental Health Resources     [facility] Suicide Prevention Lifeline: Dial #810  If you prefer to text, you can reach the Crisis Text Line by texting “[place]” to 394133    ¿Te encuentras en crisis? Envía un mensaje de texto con la palabra AYUDA al [phone] para comunicarte de manera gratuita con un Consejero de Crisis  Apoyo gratuito las 24 horas del día, los 7 días de la semana, al alcance de tu mano.    [facility] CRISIS for mental health emergencies and/or please go to your local Emergency Department Call [phone] if you or a loved one are in a mental health crisis.  You can Visit https://www.dhs.[place].gov/Services/Mental-Health-In[place]/Documents/Suicide_Prevention_Hotlines.pdf to find 24/7 crisis phone line for other [facility].    [facility]   ? Location 1: [address] [place] [address] [phone]   ? Location 2: [address] [address] [phone]        ? English only* Serves ages 4+          ? Accepts Medicare and some commercial plans    [facility]   ? [address] [place] [address] [phone]; [phone]  ? Bilingual English/Northern Irish Serves ages 5+   ? Accepts Medical Assistance     [facility]   ? [address] [place] [address] [phone]   ? Bilingual English/Northern Irish Serves ages 14+   ? Accepts Medical Assistance, (Not currently accepting Medicare), & Major Commercial Insurances     [facility]   ? [address] [place] [address] [phone]        ? Bilingual English/Northern Irish Serves ages 6+   ? Accepts Medical Assistance & Commercial Insurance     [facility]   ? Previous [facility] location is closed  ? [address], [address] [phone]   ? Bilingual English/Northern Irish Serves ages 3+   ? Accepts Medical Assistance & Some Commercial Insurance     [facility]   ? [address] [place] [address] [phone]   ? Bilingual English/Northern Irish Serves ages 5+   ? Accepts Medical Assistance     [facility]   ? [address] [place] [address] [phone]  ? Bilingual English/Nauruan Serves ages 3+   ? Accepts Medical Assistance & Some Commercial Insurance     [facility]   ? Location 1: [address] [place] [address] [phone]        ? Location 2: [address] [place] [address]   ? Bilingual English/Nauruan Serves ages 18+  ? Accepts Medical Assistance    [facility], [facility]  ? [address], [place] [address] [phone]  ? Bilingual English/Nauruan  ? Accepts Aetna, Cigna, Optum/[place] [employer] [facility], [facility], [employer], Medicare, [employer]/[facility], [facility]. No Medical Assistance    [facility] ([phone])  Medicare/Medicare Advantage, Medical Assistance/HealthChoices, Commercial, and self-pay as payment.    TEEN HELP LINE  ? 0-731-094-TALK    CRIME VICTIMS [place]       ? [phone]       ? 24/7 [facility] Hotline    [facility]       ? [phone]       ? 24/7 [facility] Hotline    www.psychologyAchieveMintday[employer]" is a resource to find psychotherapy providers, patients can filter therapist search list based on a number of criteria including language, specialty, gender, insurance, etc. Individuals seeking will need to reach out to perspective providers through information in the directory. You are encouraged to contact multiple providers to given that many providers have a significant wait list for services as well as to find a provider is a good fit for you!    [facility] is an organization of families, friends and individuals whose lives have been affected by mental illness. Together, we advocate for better lives for those individuals who have a m)ental illness. Visit: [facility].org to learn more or to search for local support resources including qualified mental health providers.

## 2024-05-01 NOTE — ASSESSMENT & PLAN NOTE
Chronic issue, recommend increased fluid and fiber  Start Miralax daily   Referral to GI/ colonoscopy

## 2024-05-01 NOTE — ASSESSMENT & PLAN NOTE
Wt Readings from Last 3 Encounters:   05/01/24 51.7 kg (114 lb)   03/28/23 54.3 kg (119 lb 11.2 oz)   09/19/21 50.7 kg (111 lb 12.8 oz)     Likely related to diet/ lifestyle   Will check labs

## 2024-05-01 NOTE — PROGRESS NOTES
ADULT ANNUAL PHYSICAL  [facility] [place]    NAME: John Wright  AGE: 48 y.o. SEX: male  : 1975     DATE: 2024     Assessment and Plan:     Problem List Items Addressed This Visit          Behavioral Health    Anxiety     Patient currently not following with  provider, he would like to re-establish and restart medication before he has exacerbation of symptoms   He requests to restart alprazolam only, states this was most effective for him at controlling anger and anxiety  Discussed how this is not preferred treatment and I would not recommend this   On record review patient was also taking Abilify, more appropriate to restart            Relevant Orders    Ambulatory referral to Psych Services       Other    Constipation - Primary     Chronic issue, recommend increased fluid and fiber  Start Miralax daily   Referral to GI/ colonoscopy          Relevant Medications    polyethylene glycol (GLYCOLAX) 17 GM/SCOOP powder    Other Relevant Orders    Ambulatory Referral to Gastroenterology    Neck mass     Firm, tender mass posterior neck ~2 cm   Patient states has gotten much larger over past several months   U/S ordered          Relevant Orders    US head neck soft tissue    Screening cholesterol level    Relevant Orders    Lipid panel    Chest wall mass     Right chest wall fluctuant mass  U/S ordered          Relevant Orders    US MSK limited    BMI less than 19,adult     Wt Readings from Last 3 Encounters:   24 51.7 kg (114 lb)   23 54.3 kg (119 lb 11.2 oz)   21 50.7 kg (111 lb 12.8 oz)     Likely related to diet/ lifestyle   Will check labs           Other Visit Diagnoses       Medication refill        Relevant Medications    ARIPiprazole (ABILIFY) 5 mg tablet    Screening for diabetes mellitus        Relevant Orders    Comprehensive metabolic panel    HIV screening declined        Screening for hepatitis C declined                 Immunizations and preventive care screenings were discussed with patient today. Appropriate education was printed on patient's after visit summary.    Discussed risks and benefits of prostate cancer screening. We discussed the controversial history of PSA screening for prostate cancer in the [place] as well as the risk of over detection and over treatment of prostate cancer by way of PSA screening.  The patient understands that PSA blood testing is an imperfect way to screen for prostate cancer and that elevated PSA levels in the blood may also be caused by infection, inflammation, prostatic trauma or manipulation, urological procedures, or by benign prostatic enlargement.    The role of the digital rectal examination in prostate cancer screening was also discussed and I discussed with him that there is large interobserver variability in the findings of digital rectal examination.    Counseling:  Alcohol/drug use: discussed moderation in alcohol intake, the recommendations for healthy alcohol use, and avoidance of illicit drug use.  Dental Health: discussed importance of regular tooth brushing, flossing, and dental visits.  Injury prevention: discussed safety/seat belts, safety helmets, smoke detectors, carbon dioxide detectors, and smoking near bedding or upholstery.  Sexual health: discussed sexually transmitted diseases, partner selection, use of condoms, avoidance of unintended pregnancy, and contraceptive alternatives.  Exercise: the importance of regular exercise/physical activity was discussed. Recommend exercise 3-5 times per week for at least 30 minutes.     BMI Counseling: Body mass index is 17.85 kg/m². The BMI is below normal. Patient advised to gain weight and dietary education for weight gain was provided. Rationale for BMI follow-up plan is due to patient being underweight.     Depression Screening and Follow-up Plan: Patient was screened for depression during today's encounter. They  screened negative with a PHQ-9 score of 4.        Return in about 4 weeks (around 2024) for anxiety.     Chief Complaint:     Chief Complaint   Patient presents with    New Patient Visit      History of Present Illness:     Adult Annual Physical   Patient is a 47 YO male who  has a past medical history of Anxiety, Depression, and Schizophrenia ([facility]).who presents to establish care and for a comprehensive physical exam. He is accompanied by his mother. The patient reports that he is not currently established with  provider and is afraid he may have an episode requiring hospitalization since he has been off of medications. He reports that the medication that worked best for him was alprazolam BID. He states that whenever he has been started on a medication regimen, once he felt good he stopped taking the medication and cycle repeated. He tried to reestablish care at [facility] where he was previously receiving services for  but they told him there is a long wait and to have PCP manage medications. Currently denies SI, self injury.     He has two lumps he needs checked, growing in size over the last several months. Has chronic constipation. Denies any drug, alcohol, or tobacco use     Diet and Physical Activity  Diet/Nutrition: limited fruits/vegetables.   Exercise: no formal exercise.      Depression Screening  PHQ-2/9 Depression Screening    Little interest or pleasure in doing things: 0 - not at all  Feeling down, depressed, or hopeless: 1 - several days  Trouble falling or staying asleep, or sleeping too much: 1 - several days  Feeling tired or having little energy: 0 - not at all  Poor appetite or overeatin - not at all  Feeling bad about yourself - or that you are a failure or have let yourself or your family down: 1 - several days  Trouble concentrating on things, such as reading the newspaper or watching television: 1 - several days  Moving or speaking so slowly that other people could have noticed.  Or the opposite - being so fidgety or restless that you have been moving around a lot more than usual: 0 - not at all  Thoughts that you would be better off dead, or of hurting yourself in some way: 0 - not at all  PHQ-9 Score: 4  PHQ-9 Interpretation: No or Minimal depression       General Health  Sleep: sleeps poorly.   Hearing: normal - bilateral.  Vision: wears glasses.   Dental: no dental visits for >1 year.        Health  Symptoms include: none    Advanced Care Planning  Do you have an advanced directive? no  Do you have a durable medical power of ? no  ACP document given to patient? no     Review of Systems:     Review of Systems   Constitutional:  Negative for chills and fever.   HENT:  Negative for ear pain and sore throat.    Eyes:  Negative for pain and visual disturbance.   Respiratory:  Negative for cough and shortness of breath.    Cardiovascular:  Negative for chest pain and palpitations.   Gastrointestinal:  Negative for abdominal pain and vomiting.   Genitourinary:  Negative for dysuria and hematuria.   Skin:  Negative for rash.   Neurological:  Negative for seizures and syncope.   Psychiatric/Behavioral:  Negative for agitation, hallucinations, self-injury and suicidal ideas. The patient is nervous/anxious.    All other systems reviewed and are negative.     Past Medical History:     Past Medical History:   Diagnosis Date    Anxiety     Depression     Schizophrenia (HCC)       Past Surgical History:     History reviewed. No pertinent surgical history.   Family History:     History reviewed. No pertinent family history.   Social History:     Social History     Socioeconomic History    Marital status: Single     Spouse name: None    Number of children: None    Years of education: None    Highest education level: None   Occupational History    None   Tobacco Use    Smoking status: Former     Types: Cigarettes    Smokeless tobacco: Never   Vaping Use    Vaping status: Never Used   Substance  "and Sexual Activity    Alcohol use: Not Currently     Comment: socially    Drug use: Not Currently     Types: Cocaine    Sexual activity: None   Other Topics Concern    None   Social History Narrative    None     Social Determinants of Health     Financial Resource Strain: Low Risk  (5/1/2024)    Overall Financial Resource Strain (CARDIA)     Difficulty of Paying Living Expenses: Not hard at all   Food Insecurity: No Food Insecurity (5/1/2024)    Hunger Vital Sign     Worried About Running Out of Food in the Last Year: Never true     Ran Out of Food in the Last Year: Never true   Transportation Needs: Unmet Transportation Needs (5/1/2024)    PRAPARE - Transportation     Lack of Transportation (Medical): Yes     Lack of Transportation (Non-Medical): Yes   Physical Activity: Not on file   Stress: Not on file   Social Connections: Not on file   Intimate Partner Violence: Not on file   Housing Stability: Low Risk  (5/1/2024)    Housing Stability Vital Sign     Unable to Pay for Housing in the Last Year: No     Number of Places Lived in the Last Year: 1     Unstable Housing in the Last Year: No      Current Medications:     Current Outpatient Medications   Medication Sig Dispense Refill    ARIPiprazole (ABILIFY) 5 mg tablet Take 1 tablet (5 mg total) by mouth daily 30 tablet 0    polyethylene glycol (GLYCOLAX) 17 GM/SCOOP powder Take 17 g by mouth daily 510 g 0    ALPRAZolam (XANAX) 0.25 mg tablet Take 1 tablet (0.25 mg total) by mouth 2 (two) times a day for 10 doses 10 tablet 0     No current facility-administered medications for this visit.      Allergies:     No Known Allergies   Physical Exam:     /70 (BP Location: Right arm, Patient Position: Sitting, Cuff Size: Standard)   Pulse 68   Temp 98 °F (36.7 °C) (Temporal)   Resp 16   Ht 5' 7\" (1.702 m)   Wt 51.7 kg (114 lb)   SpO2 98%   BMI 17.85 kg/m²     Physical Exam  Vitals and nursing note reviewed.   Constitutional:       General: He is not in acute " distress.     Appearance: Normal appearance. He is not diaphoretic.   HENT:      Head: Normocephalic and atraumatic.      Right Ear: Tympanic membrane and external ear normal.      Left Ear: Tympanic membrane and external ear normal.      Nose: Nose normal.      Mouth/Throat:      Mouth: Mucous membranes are moist.   Eyes:      General:         Right eye: No discharge.         Left eye: No discharge.      Extraocular Movements: Extraocular movements intact.      Conjunctiva/sclera: Conjunctivae normal.      Pupils: Pupils are equal, round, and reactive to light.   Cardiovascular:      Rate and Rhythm: Normal rate and regular rhythm.   Pulmonary:      Effort: Pulmonary effort is normal. No respiratory distress.      Breath sounds: Normal breath sounds.   Abdominal:      General: Abdomen is flat. Bowel sounds are normal.      Palpations: Abdomen is soft.      Tenderness: There is no abdominal tenderness.   Musculoskeletal:         General: Normal range of motion.      Cervical back: Normal range of motion and neck supple.      Right lower leg: No edema.      Left lower leg: No edema.   Skin:     General: Skin is warm and dry.          Neurological:      General: No focal deficit present.      Mental Status: He is alert and oriented to person, place, and time. Mental status is at baseline.   Psychiatric:         Behavior: Behavior normal.          JERMAIN Ontiveros  [facility] [place]

## 2024-05-02 DIAGNOSIS — R22.2 CHEST WALL MASS: Primary | ICD-10-CM

## 2024-05-03 DIAGNOSIS — R22.2 CHEST WALL MASS: Primary | ICD-10-CM

## 2024-05-10 DIAGNOSIS — Z12.11 COLON CANCER SCREENING: Primary | ICD-10-CM

## 2024-05-14 ENCOUNTER — TELEPHONE (OUTPATIENT)
Age: 49
End: 2024-05-14

## 2024-05-14 NOTE — TELEPHONE ENCOUNTER
05/14/24  Screened by: Milady Lauren    Referring Provider  Autumn    Pre- Screening:     There is no height or weight on file to calculate BMI. 114LBS 17.85  Has patient been referred for a routine screening Colonoscopy? yes  Is the patient between 45-75 years old? yes      Previous Colonoscopy no   If yes:    Date:     Facility:     Reason:       Does the patient want to see a Gastroenterologist prior to their procedure OR are they having any GI symptoms? yes    Has the patient been hospitalized or had abdominal surgery in the past 6 months? Yes     Does the patient use supplemental oxygen? no    Does the patient take Coumadin, Lovenox, Plavix, Elliquis, Xarelto, or other blood thinning medication? no    Has the patient had a stroke, cardiac event, or stent placed in the past year? no      If patient is between 45yrs - 49yrs, please advise patient that we will have to confirm benefits & coverage with their insurance company for a routine screening colonoscopy.